# Patient Record
Sex: FEMALE | Race: BLACK OR AFRICAN AMERICAN | NOT HISPANIC OR LATINO | Employment: UNEMPLOYED | URBAN - METROPOLITAN AREA
[De-identification: names, ages, dates, MRNs, and addresses within clinical notes are randomized per-mention and may not be internally consistent; named-entity substitution may affect disease eponyms.]

---

## 2023-08-30 PROBLEM — H35.039 HYPERTENSIVE RETINOPATHY: Status: ACTIVE | Noted: 2023-08-30

## 2023-08-30 PROBLEM — H25.811 COMBINED FORM OF AGE-RELATED CATARACT, RIGHT EYE: Status: ACTIVE | Noted: 2023-08-30

## 2023-08-30 PROBLEM — H90.3 BILATERAL SENSORINEURAL HEARING LOSS: Status: ACTIVE | Noted: 2023-08-30

## 2023-08-30 PROBLEM — H52.03 HYPERMETROPIA OF BOTH EYES: Status: ACTIVE | Noted: 2023-08-30

## 2023-08-30 PROBLEM — H25.812 COMBINED FORM OF AGE-RELATED CATARACT, LEFT EYE: Status: ACTIVE | Noted: 2023-08-30

## 2023-08-30 PROBLEM — H40.053 BILATERAL OCULAR HYPERTENSION: Status: ACTIVE | Noted: 2023-08-30

## 2023-08-30 PROBLEM — H34.8122 CENTRAL RETINAL VEIN OCCLUSION OF LEFT EYE (CMS-HCC): Status: ACTIVE | Noted: 2023-08-30

## 2023-08-30 PROBLEM — I10 HYPERTENSION: Status: ACTIVE | Noted: 2023-08-30

## 2023-08-30 PROBLEM — G43.909 MIGRAINES: Status: ACTIVE | Noted: 2023-08-30

## 2023-08-30 PROBLEM — F17.200 SMOKING ADDICTION: Status: ACTIVE | Noted: 2023-08-30

## 2023-08-30 PROBLEM — H52.4 BILATERAL PRESBYOPIA: Status: ACTIVE | Noted: 2023-08-30

## 2023-08-30 PROBLEM — H35.62 RETINAL HEMORRHAGE OF LEFT EYE: Status: ACTIVE | Noted: 2023-08-30

## 2023-08-30 RX ORDER — CYCLOBENZAPRINE HCL 10 MG
TABLET ORAL
COMMUNITY

## 2023-08-30 RX ORDER — LATANOPROST 50 UG/ML
1 SOLUTION/ DROPS OPHTHALMIC NIGHTLY
COMMUNITY
Start: 2020-12-18

## 2023-08-30 RX ORDER — NAPROXEN 500 MG/1
TABLET ORAL
COMMUNITY

## 2023-08-30 RX ORDER — GABAPENTIN 300 MG/1
CAPSULE ORAL
COMMUNITY

## 2023-08-30 RX ORDER — CLOTRIMAZOLE 1 %
CREAM (GRAM) TOPICAL
COMMUNITY

## 2023-08-30 RX ORDER — SELENIUM SULFIDE 2.5 MG/100ML
LOTION TOPICAL
COMMUNITY

## 2023-08-30 RX ORDER — KETOCONAZOLE 20 MG/G
CREAM TOPICAL
COMMUNITY
Start: 2017-04-10

## 2023-08-30 RX ORDER — NYSTATIN 100000 [USP'U]/G
POWDER TOPICAL
COMMUNITY
Start: 2022-05-24

## 2023-08-30 RX ORDER — AMILORIDE HYDROCHLORIDE 5 MG/1
TABLET ORAL
COMMUNITY
Start: 2017-07-31

## 2023-10-06 ENCOUNTER — OFFICE VISIT (OUTPATIENT)
Dept: OPHTHALMOLOGY | Facility: CLINIC | Age: 63
End: 2023-10-06
Payer: COMMERCIAL

## 2023-10-06 DIAGNOSIS — H40.053 OCULAR HYPERTENSION, BILATERAL: Primary | ICD-10-CM

## 2023-10-06 PROCEDURE — 99213 OFFICE O/P EST LOW 20 MIN: CPT | Performed by: OPHTHALMOLOGY

## 2023-10-06 PROCEDURE — 92134 CPTRZ OPH DX IMG PST SGM RTA: CPT | Mod: BILATERAL PROCEDURE | Performed by: OPHTHALMOLOGY

## 2023-10-06 ASSESSMENT — TONOMETRY
IOP_METHOD: GOLDMANN APPLANATION
OS_IOP_MMHG: 22
OD_IOP_MMHG: 22

## 2023-10-06 ASSESSMENT — VISUAL ACUITY
OD_CC+: -1
OS_CC+: -2
OS_CC: 20/25
OD_CC: 20/20
METHOD: SNELLEN - LINEAR

## 2023-10-06 ASSESSMENT — EXTERNAL EXAM - LEFT EYE: OS_EXAM: NORMAL

## 2023-10-06 ASSESSMENT — CUP TO DISC RATIO
OD_RATIO: 0.1
OS_RATIO: 0.1

## 2023-10-06 ASSESSMENT — SLIT LAMP EXAM - LIDS
COMMENTS: MILD PAPILLARY REACTION
COMMENTS: MILD PAPILLARY REACTION

## 2023-10-06 ASSESSMENT — EXTERNAL EXAM - RIGHT EYE: OD_EXAM: NORMAL

## 2023-10-06 NOTE — PROGRESS NOTES
"        Impression    1 H34.8122 Central retinal vein occlusion of left eye-Improving  2 H40.053 Bilateral ocular hypertension-Stable  3 H35.039 Hypertensive retinopathy-Stable  4 H25.811 Combined form of age-related cataract, right eye-Stable  5 H25.812 Combined form of age-related cataract, left eye-Stable  6 F17.200 Smoking addiction-Stable  7 H35.62 Retinal hemorrhage of left eye-Stable  8 H52.03 Hypermetropia of both eyes-Stable  9 H52.4 Bilateral presbyopia-Stable          Discussion    OCT macula  OD normal foveal contour, no edema  OS normal foveal contour, no IRF/SRF, parafoveal thickening compared to prior    OS Retinal vein occlusion   CRVO vs HRVO  No hx DM but \"prediabetic\" no edema today  No CME, but with persistent changes related to vein occlusion and with parafoveal retinal thickening         No intervention today needed  Will refer back to optom for correction of glasses, as pt feels current prescription still not acceptable  "

## 2023-11-15 ENCOUNTER — OFFICE VISIT (OUTPATIENT)
Dept: DERMATOLOGY | Facility: CLINIC | Age: 63
End: 2023-11-15
Payer: COMMERCIAL

## 2023-11-15 DIAGNOSIS — L65.9 ALOPECIA: Primary | ICD-10-CM

## 2023-11-15 PROCEDURE — 99203 OFFICE O/P NEW LOW 30 MIN: CPT | Performed by: STUDENT IN AN ORGANIZED HEALTH CARE EDUCATION/TRAINING PROGRAM

## 2023-11-15 RX ORDER — FLUOCINOLONE ACETONIDE 0.11 MG/ML
OIL TOPICAL
Qty: 60 ML | Refills: 11 | Status: SHIPPED | OUTPATIENT
Start: 2023-11-15

## 2023-11-15 NOTE — PROGRESS NOTES
Fransisco Otoole is a 63 y.o. female who presents for the following: Alopecia.     Review of Systems:  No other skin or systemic complaints other than what is documented elsewhere in the note.    The following portions of the chart were reviewed this encounter and updated as appropriate:          Skin Cancer History  No skin cancer on file.      Specialty Problems    None       Objective   Well appearing patient in no apparent distress; mood and affect are within normal limits.    A focused skin examination was performed. All findings within normal limits unless otherwise noted below.    Assessment/Plan   1. Alopecia  Scalp  Thinning in front bilaterallyand vertex, mild    Favor multi factorial: traction, likelywithcomponent of telogen efluvium or maybe mild earlyCCCA    fluocinolone and shower cap 0.01 % oil - Scalp  Once a week, leave it on overnight    Patient prefers to avoid minoxidil for now- will consider in 6 month

## 2024-02-12 ENCOUNTER — OFFICE VISIT (OUTPATIENT)
Dept: OPHTHALMOLOGY | Facility: CLINIC | Age: 64
End: 2024-02-12
Payer: COMMERCIAL

## 2024-02-12 DIAGNOSIS — H52.4 HYPEROPIA OF BOTH EYES WITH ASTIGMATISM AND PRESBYOPIA: ICD-10-CM

## 2024-02-12 DIAGNOSIS — H40.053 BILATERAL OCULAR HYPERTENSION: Primary | ICD-10-CM

## 2024-02-12 DIAGNOSIS — H52.03 HYPEROPIA OF BOTH EYES WITH ASTIGMATISM AND PRESBYOPIA: ICD-10-CM

## 2024-02-12 DIAGNOSIS — H52.203 HYPEROPIA OF BOTH EYES WITH ASTIGMATISM AND PRESBYOPIA: ICD-10-CM

## 2024-02-12 PROCEDURE — 92133 CPTRZD OPH DX IMG PST SGM ON: CPT | Performed by: OPTOMETRIST

## 2024-02-12 PROCEDURE — 92012 INTRM OPH EXAM EST PATIENT: CPT | Performed by: OPTOMETRIST

## 2024-02-12 PROCEDURE — 92015 DETERMINE REFRACTIVE STATE: CPT | Performed by: OPTOMETRIST

## 2024-02-12 ASSESSMENT — REFRACTION_WEARINGRX
OD_SPHERE: +1.50
OS_SPHERE: +1.25
OD_ADD: +2.25
OD_CYLINDER: -0.50
OD_AXIS: 078
OS_CYLINDER: SPHER
OS_ADD: +2.25

## 2024-02-12 ASSESSMENT — ENCOUNTER SYMPTOMS
CARDIOVASCULAR NEGATIVE: 1
GASTROINTESTINAL NEGATIVE: 0
CONSTITUTIONAL NEGATIVE: 0
HEMATOLOGIC/LYMPHATIC NEGATIVE: 0
ENDOCRINE NEGATIVE: 0
PSYCHIATRIC NEGATIVE: 0
RESPIRATORY NEGATIVE: 0
MUSCULOSKELETAL NEGATIVE: 0
ALLERGIC/IMMUNOLOGIC NEGATIVE: 0
NEUROLOGICAL NEGATIVE: 0
EYES NEGATIVE: 0

## 2024-02-12 ASSESSMENT — REFRACTION_MANIFEST
OS_SPHERE: +1.00
OD_SPHERE: +1.75
OS_ADD: +2.50
OD_AXIS: 080
OS_CYLINDER: SPHERE
OD_CYLINDER: -0.75
OD_ADD: +2.50

## 2024-02-12 ASSESSMENT — VISUAL ACUITY
METHOD: SNELLEN - LINEAR
OS_CC: 20/20
CORRECTION_TYPE: GLASSES
OD_CC: 20/20
OD_CC+: -1

## 2024-02-12 ASSESSMENT — SLIT LAMP EXAM - LIDS
COMMENTS: MILD PAPILLARY REACTION
COMMENTS: MILD PAPILLARY REACTION

## 2024-02-12 ASSESSMENT — PACHYMETRY
OS_CT(UM): 567
OD_CT(UM): 560

## 2024-02-12 ASSESSMENT — EXTERNAL EXAM - LEFT EYE: OS_EXAM: NORMAL

## 2024-02-12 ASSESSMENT — TONOMETRY
OD_IOP_MMHG: 20
IOP_METHOD: GOLDMANN APPLANATION
OS_IOP_MMHG: 19

## 2024-02-12 ASSESSMENT — EXTERNAL EXAM - RIGHT EYE: OD_EXAM: NORMAL

## 2024-02-12 ASSESSMENT — CUP TO DISC RATIO
OD_RATIO: 0.1
OS_RATIO: 0.1

## 2024-02-12 NOTE — PROGRESS NOTES
Hx of CRVO OS and followed by Dr Mead.    Hx of ocular HTN (managed with [place]) with nl OCT RNFL and CD ratio.  Tmax on IOP lowering gtt (timolol and latanaprost QHS) 25, 24. IOP 20/19 pachy adjust -1 OU Taking both meds in the PM and every day.      Optical coherence tomography of the retinal nerve fiber layer (RNFL) revealed:    OD: Normal thickness in all four sectors with an average RNFL thickness of 93 cirrus 113 was 110 micron.   OS: Normal thickness in all four sectors with an average RNFL thickness of 97 cirrus was unable was 119 micron.  Manifest refraction (MR) dispensed. [facility] corrects to 20/20 OD and OS.    Continue latanaprost and RTc 6 months for IOP and OCT RNFL.

## 2024-08-12 ENCOUNTER — APPOINTMENT (OUTPATIENT)
Dept: OPHTHALMOLOGY | Facility: CLINIC | Age: 64
End: 2024-08-12
Payer: COMMERCIAL

## 2024-08-12 DIAGNOSIS — H40.053 BILATERAL OCULAR HYPERTENSION: Primary | ICD-10-CM

## 2024-08-12 PROCEDURE — 92012 INTRM OPH EXAM EST PATIENT: CPT | Performed by: OPTOMETRIST

## 2024-08-12 PROCEDURE — 92133 CPTRZD OPH DX IMG PST SGM ON: CPT | Performed by: OPTOMETRIST

## 2024-08-12 ASSESSMENT — SLIT LAMP EXAM - LIDS
COMMENTS: MILD PAPILLARY REACTION
COMMENTS: MILD PAPILLARY REACTION

## 2024-08-12 ASSESSMENT — ENCOUNTER SYMPTOMS
ALLERGIC/IMMUNOLOGIC NEGATIVE: 0
MUSCULOSKELETAL NEGATIVE: 0
NEUROLOGICAL NEGATIVE: 0
CONSTITUTIONAL NEGATIVE: 0
PSYCHIATRIC NEGATIVE: 0
EYES NEGATIVE: 0
RESPIRATORY NEGATIVE: 0
CARDIOVASCULAR NEGATIVE: 0
GASTROINTESTINAL NEGATIVE: 0
ENDOCRINE NEGATIVE: 0
HEMATOLOGIC/LYMPHATIC NEGATIVE: 0

## 2024-08-12 ASSESSMENT — EXTERNAL EXAM - LEFT EYE: OS_EXAM: NORMAL

## 2024-08-12 ASSESSMENT — PACHYMETRY
OD_CT(UM): 560
OS_CT(UM): 567

## 2024-08-12 ASSESSMENT — TONOMETRY
IOP_METHOD: GOLDMANN APPLANATION
OS_IOP_MMHG: 20
OD_IOP_MMHG: 20

## 2024-08-12 ASSESSMENT — VISUAL ACUITY
OS_CC: 20/20
OD_CC: 20/20
METHOD: SNELLEN - LINEAR
CORRECTION_TYPE: GLASSES
OD_CC+: -1

## 2024-08-12 ASSESSMENT — CUP TO DISC RATIO
OD_RATIO: 0.1
OS_RATIO: 0.1

## 2024-08-12 ASSESSMENT — EXTERNAL EXAM - RIGHT EYE: OD_EXAM: NORMAL

## 2024-08-12 NOTE — PROGRESS NOTES
Hx of CRVO OS and followed by Dr Mead.    Hx of ocular HTN (managed with [facility]) with nl OCT RNFL and CD ratio.  Tmax on IOP lowering gtt (timolol and latanaprost QHS) 25/24. IOP 20/20 pachy adjust -1 OD/OS  Taking both meds in the PM and every day.      Optical coherence tomography of the retinal nerve fiber layer (RNFL) revealed:    OD: Normal thickness in all four sectors with an average RNFL thickness of 108 heidelberg was 93 cirrus 113 was 110 micron.   OS: Normal thickness in all four sectors with an average RNFL thickness of 11 heidelberg was 97 cirrus was unable was 119 micron.    [facility] corrects to 20/20 OD and OS.      Continue latanaprost and RTc 6 months for manifest refraction (MR) DFE and IOP and OCT RNFL.

## 2025-02-24 ENCOUNTER — APPOINTMENT (OUTPATIENT)
Dept: OPHTHALMOLOGY | Facility: CLINIC | Age: 65
End: 2025-02-24
Payer: COMMERCIAL

## 2025-02-24 DIAGNOSIS — H40.053 BILATERAL OCULAR HYPERTENSION: Primary | ICD-10-CM

## 2025-02-24 DIAGNOSIS — H52.4 HYPEROPIA OF BOTH EYES WITH ASTIGMATISM AND PRESBYOPIA: ICD-10-CM

## 2025-02-24 DIAGNOSIS — H52.03 HYPEROPIA OF BOTH EYES WITH ASTIGMATISM AND PRESBYOPIA: ICD-10-CM

## 2025-02-24 DIAGNOSIS — H52.203 HYPEROPIA OF BOTH EYES WITH ASTIGMATISM AND PRESBYOPIA: ICD-10-CM

## 2025-02-24 DIAGNOSIS — H34.8122 STABLE CENTRAL RETINAL VEIN OCCLUSION OF LEFT EYE (CMS-HCC): ICD-10-CM

## 2025-02-24 PROCEDURE — 92014 COMPRE OPH EXAM EST PT 1/>: CPT | Performed by: OPTOMETRIST

## 2025-02-24 PROCEDURE — 92015 DETERMINE REFRACTIVE STATE: CPT | Performed by: OPTOMETRIST

## 2025-02-24 PROCEDURE — 92133 CPTRZD OPH DX IMG PST SGM ON: CPT | Performed by: OPTOMETRIST

## 2025-02-24 ASSESSMENT — CONF VISUAL FIELD
OD_SUPERIOR_TEMPORAL_RESTRICTION: 0
OD_INFERIOR_NASAL_RESTRICTION: 0
OS_INFERIOR_NASAL_RESTRICTION: 0
OS_NORMAL: 1
OS_INFERIOR_TEMPORAL_RESTRICTION: 0
OD_NORMAL: 1
OS_SUPERIOR_NASAL_RESTRICTION: 0
OD_INFERIOR_TEMPORAL_RESTRICTION: 0
OD_SUPERIOR_NASAL_RESTRICTION: 0
METHOD: COUNTING FINGERS
OS_SUPERIOR_TEMPORAL_RESTRICTION: 0

## 2025-02-24 ASSESSMENT — PACHYMETRY
OS_CT(UM): 567
OD_CT(UM): 560

## 2025-02-24 ASSESSMENT — ENCOUNTER SYMPTOMS
PSYCHIATRIC NEGATIVE: 0
MUSCULOSKELETAL NEGATIVE: 0
NEUROLOGICAL NEGATIVE: 0
ALLERGIC/IMMUNOLOGIC NEGATIVE: 0
EYES NEGATIVE: 0
GASTROINTESTINAL NEGATIVE: 0
CARDIOVASCULAR NEGATIVE: 1
CONSTITUTIONAL NEGATIVE: 0
ENDOCRINE NEGATIVE: 0
HEMATOLOGIC/LYMPHATIC NEGATIVE: 0
RESPIRATORY NEGATIVE: 0

## 2025-02-24 ASSESSMENT — VISUAL ACUITY
OS_CC: 20/
OD_CC: 20/20
METHOD: SNELLEN - LINEAR
CORRECTION_TYPE: GLASSES

## 2025-02-24 ASSESSMENT — REFRACTION_WEARINGRX
OD_AXIS: 080
OS_CYLINDER: SPHERE
OS_SPHERE: +1.00
OS_ADD: +2.50
OD_SPHERE: +1.75
OD_CYLINDER: -0.75
OD_ADD: +2.50

## 2025-02-24 ASSESSMENT — EXTERNAL EXAM - LEFT EYE: OS_EXAM: NORMAL

## 2025-02-24 ASSESSMENT — REFRACTION_MANIFEST
OS_ADD: +2.50
OD_SPHERE: +1.75
OD_CYLINDER: -0.75
OD_AXIS: 080
OS_CYLINDER: SPHERE
OD_ADD: +2.50
OS_SPHERE: +1.00

## 2025-02-24 ASSESSMENT — CUP TO DISC RATIO
OD_RATIO: 0.1
OS_RATIO: 0.1

## 2025-02-24 ASSESSMENT — SLIT LAMP EXAM - LIDS
COMMENTS: MILD PAPILLARY REACTION
COMMENTS: MILD PAPILLARY REACTION

## 2025-02-24 ASSESSMENT — TONOMETRY
OD_IOP_MMHG: 25
IOP_METHOD: GOLDMANN APPLANATION
OS_IOP_MMHG: 28

## 2025-02-24 ASSESSMENT — EXTERNAL EXAM - RIGHT EYE: OD_EXAM: NORMAL

## 2025-02-24 NOTE — PROGRESS NOTES
Hx of CRVO OS and followed by Dr Mead in the past. DFE looks good today. Patient would like to consolidate care at [facility]. RTC for annual DFE with [employer].     Hx of ocular HTN (managed with [employer]) with nl OCT RNFL and CD ratio.  Tmax on IOP lowering gtt (timolol and latanaprost QHS) 25/28. IOP 25/28 pachy adjust -1 OD/OS  Taking both meds in the PM and every day.      Optical coherence tomography of the retinal nerve fiber layer (RNFL) revealed:    OD: Normal thickness in all four sectors with an average RNFL thickness of 108 was 108 heidelberg was 93 cirrus 113 was 110 micron.   OS: Normal thickness in all four sectors with an average RNFL thickness of 113 was 111 heidelberg was 97 cirrus was unable was 119 micron.    Minor NS OU. [facility] corrects to 20/20 OD and OS.     A spectacle prescription was dispensed to be used as needed.     IOP elevated. Patient missed drops 3x last month fully compliant this month.   Start using timolol BID and latanoprost QHS and RTC 1 month for IOP recheck. If elevated again will switch to other PGAI.     1 month IOP

## 2025-10-20 ENCOUNTER — APPOINTMENT (OUTPATIENT)
Dept: OPHTHALMOLOGY | Facility: CLINIC | Age: 65
End: 2025-10-20
Payer: COMMERCIAL